# Patient Record
Sex: FEMALE | Race: WHITE | NOT HISPANIC OR LATINO | Employment: FULL TIME
[De-identification: names, ages, dates, MRNs, and addresses within clinical notes are randomized per-mention and may not be internally consistent; named-entity substitution may affect disease eponyms.]

---

## 2017-06-17 ENCOUNTER — HEALTH MAINTENANCE LETTER (OUTPATIENT)
Age: 47
End: 2017-06-17

## 2019-10-01 ENCOUNTER — HEALTH MAINTENANCE LETTER (OUTPATIENT)
Age: 49
End: 2019-10-01

## 2019-10-30 ENCOUNTER — HEALTH MAINTENANCE LETTER (OUTPATIENT)
Age: 49
End: 2019-10-30

## 2021-01-15 ENCOUNTER — HEALTH MAINTENANCE LETTER (OUTPATIENT)
Age: 51
End: 2021-01-15

## 2021-01-23 ENCOUNTER — HEALTH MAINTENANCE LETTER (OUTPATIENT)
Age: 51
End: 2021-01-23

## 2021-07-20 ENCOUNTER — TRANSFERRED RECORDS (OUTPATIENT)
Dept: MULTI SPECIALTY CLINIC | Facility: CLINIC | Age: 51
End: 2021-07-20

## 2021-07-20 LAB
HPV ABSTRACT: NORMAL
PAP-ABSTRACT: NORMAL

## 2021-09-04 ENCOUNTER — HEALTH MAINTENANCE LETTER (OUTPATIENT)
Age: 51
End: 2021-09-04

## 2022-02-19 ENCOUNTER — HEALTH MAINTENANCE LETTER (OUTPATIENT)
Age: 52
End: 2022-02-19

## 2022-10-22 ENCOUNTER — HEALTH MAINTENANCE LETTER (OUTPATIENT)
Age: 52
End: 2022-10-22

## 2023-04-01 ENCOUNTER — HEALTH MAINTENANCE LETTER (OUTPATIENT)
Age: 53
End: 2023-04-01

## 2024-07-06 ENCOUNTER — OFFICE VISIT (OUTPATIENT)
Dept: FAMILY MEDICINE | Facility: OTHER | Age: 54
End: 2024-07-06
Payer: COMMERCIAL

## 2024-07-06 VITALS
BODY MASS INDEX: 24.59 KG/M2 | DIASTOLIC BLOOD PRESSURE: 70 MMHG | TEMPERATURE: 97.5 F | WEIGHT: 153 LBS | RESPIRATION RATE: 12 BRPM | HEART RATE: 76 BPM | OXYGEN SATURATION: 98 % | HEIGHT: 66 IN | SYSTOLIC BLOOD PRESSURE: 110 MMHG

## 2024-07-06 DIAGNOSIS — L03.011 CELLULITIS OF RIGHT MIDDLE FINGER: Primary | ICD-10-CM

## 2024-07-06 PROCEDURE — 99203 OFFICE O/P NEW LOW 30 MIN: CPT

## 2024-07-06 RX ORDER — SULFAMETHOXAZOLE/TRIMETHOPRIM 800-160 MG
1 TABLET ORAL 2 TIMES DAILY
Qty: 14 TABLET | Refills: 0 | Status: SHIPPED | OUTPATIENT
Start: 2024-07-06 | End: 2024-07-13

## 2024-07-06 RX ORDER — ESTRADIOL 0.05 MG/D
1 PATCH TRANSDERMAL WEEKLY
COMMUNITY

## 2024-07-06 RX ORDER — PROGESTERONE 100 MG/1
100 CAPSULE ORAL AT BEDTIME
COMMUNITY
Start: 2024-03-15

## 2024-07-06 ASSESSMENT — PAIN SCALES - GENERAL: PAINLEVEL: MILD PAIN (2)

## 2024-07-06 NOTE — PROGRESS NOTES
ASSESSMENT/PLAN:    I have reviewed the nursing notes.  I have reviewed the findings, diagnosis, plan and need for follow up with the patient.    1. Cellulitis of right middle finger  - sulfamethoxazole-trimethoprim (BACTRIM DS) 800-160 MG tablet; Take 1 tablet by mouth 2 times daily for 7 days  Dispense: 14 tablet; Refill: 0    Patient presents with tenderness, erythema, and swelling of her right middle finger.  Infection started out as a paronychia but discussed with patient that it is now 2 due to cellulitis of the distal portion of her right middle finger.  Will treat the cellulitis with Bactrim.  Advised patient she can take Tylenol and ibuprofen as needed.  May use cool compresses and continue doing Epsom salt soaks.    Discussed warning signs/symptoms indicative of need to f/u    Follow up if symptoms persist or worsen or concerns    I explained my diagnostic considerations and recommendations to the patient, who voiced understanding and agreement with the treatment plan. All questions were answered. We discussed potential side effects of any prescribed or recommended therapies, as well as expectations for response to treatments.    Dipesh Deutsch, ILEANA CNP  7/6/2024  3:27 PM    HPI:    Candice López is a 53 year old female  who presents to [facility] today for concerns of infection around fingernail and finger    skin infection/cellulitis to right middle finger x 10 day duration.     Causation/Event: infection started around her nail  Symptoms: edema, skin erythema (reddened skin), and tenderness  Progression: worsening  No predisposing conditions (trauma, eczema, psoriasis, ulcers, bites, tinea infection, etc.)  No recent surgery/procedure  No recent infection(s)    Presence of any of the following comorbid conditions:  No diabetic  No tobacco use  Chronic Renal Disease: No  Chronic Hepatic Disease: No    Prior history of similar symptoms: has had paronychias in the past    Treatments tried:  oregano oil, soaks    Allergies: NKA      Past Medical History:   Diagnosis Date    Back pain     Depression     Surveillance of previously prescribed intrauterine contraceptive device     Mirena 11/11 removed 3/2/12    Vaginal delivery     X 2     Past Surgical History:   Procedure Laterality Date    COLONOSCOPY  2011    repeat 2015(+ family hx)    HC ABLATION, ENDOMETRIAL, THERMAL, W/O HYSTEROSCOPIC GUIDANCE  3/2011    HC DILATION/CURETTAGE DIAG/THER NON OB  5/2010    Polyp removed.     ZZC NONSPECIFIC PROCEDURE  March 2008    fusion L5S1    ZZC NONSPECIFIC PROCEDURE  Dec 2008    removal of excess labial tissue     Social History     Tobacco Use    Smoking status: Former    Smokeless tobacco: Never    Tobacco comments:     20 years ago   Substance Use Topics    Alcohol use: Not Currently     Comment: rare     Current Outpatient Medications   Medication Sig Dispense Refill    estradiol (CLIMARA) 0.05 MG/24HR weekly patch Place 1 patch onto the skin once a week      progesterone (PROMETRIUM) 100 MG capsule Take 100 mg by mouth at bedtime      ACIDOPHILUS PROBIOTIC BLEND OR None Entered (Patient not taking: Reported on 7/6/2024)      DIGESTIVE ENZYMES PO Take 1 capsule by mouth daily. (Patient not taking: Reported on 7/6/2024)      FISH OIL OR None Entered (Patient not taking: Reported on 7/6/2024)      Misc Natural Products (GLUCOSAMINE CHOND COMPLEX/MSM PO) Take 1 tablet by mouth daily. (Patient not taking: Reported on 7/6/2024)      ORDER FOR DME Equipment being ordered: Light Box for seasonal affective disorder (Patient not taking: Reported on 7/6/2024) 1 each 0    VITAMIN D OR None Entered (Patient not taking: Reported on 7/6/2024)       No Known Allergies  Past medical history, past surgical history, current medications and allergies reviewed and accurate to the best of my knowledge.      ROS:  Refer to HPI    /70 (BP Location: Right arm, Patient Position: Sitting, Cuff Size: Adult Regular)   Pulse 76    "Temp 97.5  F (36.4  C) (Tympanic)   Resp 12   Ht 1.676 m (5' 6\")   Wt 69.4 kg (153 lb)   LMP 01/13/2010   SpO2 98%   BMI 24.69 kg/m      EXAM:  General Appearance: Well appearing 53 year old female, appropriate appearance for age. No acute distress   Musculoskeletal:  Equal movement of bilateral upper extremities.  Equal movement of bilateral lower extremities.  Normal gait.    Dermatological: Erythema and edema of distal portion of right middle finger, very small paronychia on the side of patient's fingernail, mild tenderness with palpation  Neuro: Alert and oriented to person, place, and time.    Psychological: normal affect, alert, oriented, and pleasant.     "

## 2024-07-06 NOTE — NURSING NOTE
"Pt presents to  with her mom for infection in her R middle finger x10 days. Pt has tried soaking, polysporin and oregano oil. She does not want oral antibiotics, but would like the infection to be released if possible.    Chief Complaint   Patient presents with    Infection     R middle finger       FOOD SECURITY SCREENING QUESTIONS  Hunger Vital Signs:  Within the past 12 months we worried whether our food would run out before we got money to buy more. Never  Within the past 12 months the food we bought just didn't last and we didn't have money to get more. Never  Cathleen Dearmon 7/6/2024 3:01 PM      Initial /70 (BP Location: Right arm, Patient Position: Sitting, Cuff Size: Adult Regular)   Pulse 76   Temp 97.5  F (36.4  C) (Tympanic)   Resp 12   Ht 1.676 m (5' 6\")   Wt 69.4 kg (153 lb)   LMP 01/13/2010   SpO2 98%   BMI 24.69 kg/m   Estimated body mass index is 24.69 kg/m  as calculated from the following:    Height as of this encounter: 1.676 m (5' 6\").    Weight as of this encounter: 69.4 kg (153 lb).  Medication Reconciliation: complete    Cathleen Dearmon    "

## 2024-08-10 ENCOUNTER — HEALTH MAINTENANCE LETTER (OUTPATIENT)
Age: 54
End: 2024-08-10

## 2025-08-16 ENCOUNTER — HEALTH MAINTENANCE LETTER (OUTPATIENT)
Age: 55
End: 2025-08-16